# Patient Record
Sex: FEMALE | Race: WHITE | Employment: OTHER | URBAN - METROPOLITAN AREA
[De-identification: names, ages, dates, MRNs, and addresses within clinical notes are randomized per-mention and may not be internally consistent; named-entity substitution may affect disease eponyms.]

---

## 2017-05-09 ENCOUNTER — HOSPITAL ENCOUNTER (EMERGENCY)
Facility: HOSPITAL | Age: 22
Discharge: HOME | End: 2017-05-09
Attending: EMERGENCY MEDICINE
Payer: COMMERCIAL

## 2017-05-09 VITALS
DIASTOLIC BLOOD PRESSURE: 90 MMHG | TEMPERATURE: 98 F | RESPIRATION RATE: 20 BRPM | SYSTOLIC BLOOD PRESSURE: 130 MMHG | WEIGHT: 115 LBS | OXYGEN SATURATION: 98 % | BODY MASS INDEX: 21.16 KG/M2 | HEART RATE: 100 BPM | HEIGHT: 62 IN

## 2017-05-09 DIAGNOSIS — Z30.432 ENCOUNTER FOR IUD REMOVAL: ICD-10-CM

## 2017-05-09 DIAGNOSIS — T83.9XXA: Primary | ICD-10-CM

## 2017-05-09 PROCEDURE — 87808 TRICHOMONAS ASSAY W/OPTIC: CPT | Performed by: EMERGENCY MEDICINE

## 2017-05-09 PROCEDURE — 87491 CHLMYD TRACH DNA AMP PROBE: CPT | Performed by: EMERGENCY MEDICINE

## 2017-05-09 PROCEDURE — 99284 EMERGENCY DEPT VISIT MOD MDM: CPT

## 2017-05-09 PROCEDURE — 81025 URINE PREGNANCY TEST: CPT

## 2017-05-09 PROCEDURE — 87205 SMEAR GRAM STAIN: CPT | Performed by: EMERGENCY MEDICINE

## 2017-05-09 PROCEDURE — 87106 FUNGI IDENTIFICATION YEAST: CPT | Performed by: EMERGENCY MEDICINE

## 2017-05-09 PROCEDURE — 87591 N.GONORRHOEAE DNA AMP PROB: CPT | Performed by: EMERGENCY MEDICINE

## 2017-05-09 NOTE — ED NOTES
Assisted MD in Gyne exam. Removed IUD upon pt request. Pt tolerated well and stated she feels \"better\" post removal.

## 2017-05-09 NOTE — ED PROVIDER NOTES
Patient Seen in: [facility] AND [facility] Emergency Department    History   Patient presents with:  Eval-G (gynecologic)      HPI    Patient presents complaining of crampy pelvic pain all day today.   She states she was able to feel her IUD and thinks it is disl 05/09/17 0207 100   Resp 05/09/17 0207 20   Temp 05/09/17 0207 98.3 °F (36.8 °C)   Temp src 05/09/17 0207 Temporal   SpO2 05/09/17 0207 98 %   O2 Device 05/09/17 0207 None (Room air)       Physical Exam   Constitutional: She appears well-developed and well caregiver.     Disposition and Plan     Clinical Impression:  Symptom related to intrauterine device (IUD), [facility]  (primary encounter diagnosis)  Encounter for IUD removal    Disposition:  Discharge    Follow-up:  Velia Patrick MD  41

## 2017-05-09 NOTE — ED NOTES
Pt states since last night she has been experiencing pain in lower pelvic region with \"shooting\" pain, intermittently, also shooting pain down legs. Pain rated 7/10. Pt states she has been nauseous and vomited earlier today.

## (undated) NOTE — LETTER
May 9, 2017    Patient: Tabatha Lovett   Date of Visit: 5/9/2017       To Whom It May Concern:    Moy Cardoza was seen and treated in our emergency department on 5/9/2017.  She may return to school on 5/10/17    If you have any questions or con

## (undated) NOTE — ED AVS SNAPSHOT
[facility] Emergency Department    [place] [address]    Phone:  [phone]    Fax:  [phone]           Swapnatremayne Alcon   MRN: [id number]    Department:  [facility] Emergency Department   Date of Visit:  5 service to you, we would appreciate any positive or negative feedback related to the care you received in our emergency department. Please call our [facility] at [phone]. Your Emergency Department team is here to serve you.   You are our top priori I certified that I have received a copy of the aftercare instructions; that these instructions have been explained to me; all questions pertaining to these instructions have been answered in a satisfactory manner.         [email or web address] information, go to [email or web address] and click on the Sign Up Now link in the [employer] box. Enter your [employer] Activation Code exactly as it appears below along with your Zip Code and Date of Birth to complete the sign-up process.  If you do

## (undated) NOTE — ED AVS SNAPSHOT
[facility] Emergency Department    [place] [address]     [address] [place] [address]    Phone:  [phone]    Fax:  [phone]           John Kinney   MRN: [id number]    Department:  [facility] Emergency Department   Date of Visit:  5 and Class Registration line at [phone] or find a doctor online by visiting [email or web address].    IF THERE IS ANY CHANGE OR WORSENING OF YOUR CONDITION, CALL YOUR PRIMARY CARE PHYSICIAN AT ONCE OR RETURN IMMEDIATELY TO [address] [place] [address].     If